# Patient Record
Sex: FEMALE | Race: WHITE | NOT HISPANIC OR LATINO
[De-identification: names, ages, dates, MRNs, and addresses within clinical notes are randomized per-mention and may not be internally consistent; named-entity substitution may affect disease eponyms.]

---

## 2019-04-25 ENCOUNTER — APPOINTMENT (OUTPATIENT)
Dept: VASCULAR SURGERY | Facility: CLINIC | Age: 44
End: 2019-04-25

## 2019-07-24 ENCOUNTER — APPOINTMENT (OUTPATIENT)
Dept: OBGYN | Facility: CLINIC | Age: 44
End: 2019-07-24
Payer: COMMERCIAL

## 2019-07-24 VITALS
BODY MASS INDEX: 27.11 KG/M2 | DIASTOLIC BLOOD PRESSURE: 70 MMHG | HEIGHT: 63 IN | WEIGHT: 153 LBS | SYSTOLIC BLOOD PRESSURE: 122 MMHG

## 2019-07-24 DIAGNOSIS — Z80.49 FAMILY HISTORY OF MALIGNANT NEOPLASM OF OTHER GENITAL ORGANS: ICD-10-CM

## 2019-07-24 DIAGNOSIS — Z80.0 FAMILY HISTORY OF MALIGNANT NEOPLASM OF DIGESTIVE ORGANS: ICD-10-CM

## 2019-07-24 DIAGNOSIS — Z00.00 ENCOUNTER FOR GENERAL ADULT MEDICAL EXAMINATION W/OUT ABNORMAL FINDINGS: ICD-10-CM

## 2019-07-24 DIAGNOSIS — D68.51 ACTIVATED PROTEIN C RESISTANCE: ICD-10-CM

## 2019-07-24 PROCEDURE — 99386 PREV VISIT NEW AGE 40-64: CPT

## 2019-07-24 NOTE — HISTORY OF PRESENT ILLNESS
[Last Mammogram ___] : Last Mammogram was [unfilled] [Perimenopausal] : is perimenopausal [Last Pap ___] : Last cervical pap smear was [unfilled] [Condoms] : uses condoms [Contraception] : uses contraception [Last Colonoscopy ___] : Last colonoscopy [unfilled]

## 2019-07-24 NOTE — PHYSICAL EXAM
[Awake] : awake [Alert] : alert [Soft] : soft [Oriented x3] : oriented to person, place, and time [Normal] : uterus [No Bleeding] : there was no active vaginal bleeding [Uterine Adnexae] : were not tender and not enlarged [Acute Distress] : no acute distress [Mass] : no breast mass [Nipple Discharge] : no nipple discharge [Axillary LAD] : no axillary lymphadenopathy [Tender] : non tender 19

## 2019-07-24 NOTE — CHIEF COMPLAINT
[Initial Visit] : initial GYN visit [FreeTextEntry1] : needs a check up\par Pain on right side\par LMP 7/4/19\par

## 2019-07-25 LAB — HPV HIGH+LOW RISK DNA PNL CVX: NOT DETECTED

## 2019-07-29 LAB — CYTOLOGY CVX/VAG DOC THIN PREP: ABNORMAL

## 2019-08-13 ENCOUNTER — APPOINTMENT (OUTPATIENT)
Dept: OBGYN | Facility: CLINIC | Age: 44
End: 2019-08-13

## 2019-09-12 ENCOUNTER — FORM ENCOUNTER (OUTPATIENT)
Age: 44
End: 2019-09-12

## 2019-09-13 ENCOUNTER — APPOINTMENT (OUTPATIENT)
Dept: ULTRASOUND IMAGING | Facility: IMAGING CENTER | Age: 44
End: 2019-09-13
Payer: COMMERCIAL

## 2019-09-13 ENCOUNTER — OUTPATIENT (OUTPATIENT)
Dept: OUTPATIENT SERVICES | Facility: HOSPITAL | Age: 44
LOS: 1 days | End: 2019-09-13
Payer: COMMERCIAL

## 2019-09-13 ENCOUNTER — APPOINTMENT (OUTPATIENT)
Dept: MAMMOGRAPHY | Facility: IMAGING CENTER | Age: 44
End: 2019-09-13
Payer: COMMERCIAL

## 2019-09-13 DIAGNOSIS — R10.2 PELVIC AND PERINEAL PAIN: ICD-10-CM

## 2019-09-13 PROCEDURE — 77063 BREAST TOMOSYNTHESIS BI: CPT | Mod: 26

## 2019-09-13 PROCEDURE — 77067 SCR MAMMO BI INCL CAD: CPT | Mod: 26

## 2019-09-13 PROCEDURE — 76830 TRANSVAGINAL US NON-OB: CPT | Mod: 26

## 2019-09-13 PROCEDURE — 76641 ULTRASOUND BREAST COMPLETE: CPT | Mod: 26,50

## 2019-09-13 PROCEDURE — 76641 ULTRASOUND BREAST COMPLETE: CPT

## 2019-09-13 PROCEDURE — 77067 SCR MAMMO BI INCL CAD: CPT

## 2019-09-13 PROCEDURE — 76830 TRANSVAGINAL US NON-OB: CPT

## 2019-09-13 PROCEDURE — 77063 BREAST TOMOSYNTHESIS BI: CPT

## 2019-09-19 ENCOUNTER — APPOINTMENT (OUTPATIENT)
Dept: MAMMOGRAPHY | Facility: IMAGING CENTER | Age: 44
End: 2019-09-19

## 2019-09-19 ENCOUNTER — APPOINTMENT (OUTPATIENT)
Dept: ULTRASOUND IMAGING | Facility: IMAGING CENTER | Age: 44
End: 2019-09-19

## 2019-10-09 ENCOUNTER — FORM ENCOUNTER (OUTPATIENT)
Age: 44
End: 2019-10-09

## 2019-10-10 ENCOUNTER — OUTPATIENT (OUTPATIENT)
Dept: OUTPATIENT SERVICES | Facility: HOSPITAL | Age: 44
LOS: 1 days | End: 2019-10-10
Payer: COMMERCIAL

## 2019-10-10 ENCOUNTER — APPOINTMENT (OUTPATIENT)
Dept: ULTRASOUND IMAGING | Facility: IMAGING CENTER | Age: 44
End: 2019-10-10
Payer: COMMERCIAL

## 2019-10-10 ENCOUNTER — APPOINTMENT (OUTPATIENT)
Dept: MAMMOGRAPHY | Facility: IMAGING CENTER | Age: 44
End: 2019-10-10
Payer: COMMERCIAL

## 2019-10-10 DIAGNOSIS — Z00.8 ENCOUNTER FOR OTHER GENERAL EXAMINATION: ICD-10-CM

## 2019-10-10 PROCEDURE — G0279: CPT | Mod: 26

## 2019-10-10 PROCEDURE — 77066 DX MAMMO INCL CAD BI: CPT | Mod: 26

## 2019-10-10 PROCEDURE — 77062 BREAST TOMOSYNTHESIS BI: CPT | Mod: 26

## 2019-10-10 PROCEDURE — G0279: CPT

## 2019-10-10 PROCEDURE — 77066 DX MAMMO INCL CAD BI: CPT

## 2019-12-02 ENCOUNTER — TRANSCRIPTION ENCOUNTER (OUTPATIENT)
Age: 44
End: 2019-12-02

## 2021-03-30 ENCOUNTER — APPOINTMENT (OUTPATIENT)
Dept: GASTROENTEROLOGY | Facility: CLINIC | Age: 46
End: 2021-03-30

## 2021-04-06 ENCOUNTER — APPOINTMENT (OUTPATIENT)
Dept: OBGYN | Facility: CLINIC | Age: 46
End: 2021-04-06
Payer: COMMERCIAL

## 2021-04-06 VITALS
BODY MASS INDEX: 28.7 KG/M2 | WEIGHT: 162 LBS | DIASTOLIC BLOOD PRESSURE: 70 MMHG | TEMPERATURE: 96 F | SYSTOLIC BLOOD PRESSURE: 110 MMHG | HEIGHT: 63 IN

## 2021-04-06 DIAGNOSIS — R10.2 PELVIC AND PERINEAL PAIN: ICD-10-CM

## 2021-04-06 PROCEDURE — 99072 ADDL SUPL MATRL&STAF TM PHE: CPT

## 2021-04-06 PROCEDURE — 99396 PREV VISIT EST AGE 40-64: CPT

## 2021-04-06 NOTE — HISTORY OF PRESENT ILLNESS
[Patient reported mammogram was normal] : Patient reported mammogram was normal [Patient reported PAP Smear was normal] : Patient reported PAP Smear was normal [FreeTextEntry1] : no issues \par Mother with colon at age 40 \par She had endometrial ca with vaginal recurrence\par Genetic testing is negative  [Mammogramdate] : 2019  [PapSmeardate] : 2019

## 2021-04-08 LAB — HPV HIGH+LOW RISK DNA PNL CVX: NOT DETECTED

## 2021-04-12 LAB — CYTOLOGY CVX/VAG DOC THIN PREP: NORMAL

## 2021-05-03 ENCOUNTER — APPOINTMENT (OUTPATIENT)
Dept: GASTROENTEROLOGY | Facility: CLINIC | Age: 46
End: 2021-05-03

## 2021-05-06 ENCOUNTER — NON-APPOINTMENT (OUTPATIENT)
Age: 46
End: 2021-05-06

## 2021-05-06 ENCOUNTER — APPOINTMENT (OUTPATIENT)
Dept: GASTROENTEROLOGY | Facility: CLINIC | Age: 46
End: 2021-05-06
Payer: COMMERCIAL

## 2021-05-06 DIAGNOSIS — Z12.11 ENCOUNTER FOR SCREENING FOR MALIGNANT NEOPLASM OF COLON: ICD-10-CM

## 2021-05-06 DIAGNOSIS — Z80.0 ENCOUNTER FOR SCREENING FOR MALIGNANT NEOPLASM OF COLON: ICD-10-CM

## 2021-05-06 PROCEDURE — 99203 OFFICE O/P NEW LOW 30 MIN: CPT | Mod: 95

## 2021-05-06 NOTE — REASON FOR VISIT
[Home] : at home, [unfilled] , at the time of the visit. [Medical Office: ([facility])___] : at the medical office located in

## 2021-05-13 NOTE — HISTORY OF PRESENT ILLNESS
[FreeTextEntry1] : Mother had colon cancer at 48; also had endometrial CA age 62; then a recurrence of that\par Father also had colon cancer (found in a polyp, in his 60s)\par Sister has polyps\par \par Has had two prior colonoscopies; last 5 or 6 years ago (Dr. Roth); (+) polyps; she woke up during the procedure and that was traumatizing for her\par \par Patient is on the constipated side; drinks coffee, otherwise has BM q3d - this is her pattern as an adult\par \par No GERD, dysphagia\par \par Gained 20 lb during covid\par \par \par

## 2021-05-13 NOTE — ASSESSMENT
[FreeTextEntry1] : \par Discussed risks of the procedure -  including but not limited to bleeding, infection, drug reaction, perforation and missed lesion.  Also discussed benefits and alternatives of this procedure with patient - including no treatment  - and the patient consents to undergoing the procedure.\par

## 2021-06-28 ENCOUNTER — APPOINTMENT (OUTPATIENT)
Dept: DISASTER EMERGENCY | Facility: CLINIC | Age: 46
End: 2021-06-28

## 2021-07-01 ENCOUNTER — APPOINTMENT (OUTPATIENT)
Dept: GASTROENTEROLOGY | Facility: CLINIC | Age: 46
End: 2021-07-01

## 2022-01-01 ENCOUNTER — APPOINTMENT (OUTPATIENT)
Dept: DISASTER EMERGENCY | Facility: CLINIC | Age: 47
End: 2022-01-01

## 2022-01-05 ENCOUNTER — APPOINTMENT (OUTPATIENT)
Dept: GASTROENTEROLOGY | Facility: CLINIC | Age: 47
End: 2022-01-05

## 2023-06-13 ENCOUNTER — APPOINTMENT (OUTPATIENT)
Dept: OBGYN | Facility: CLINIC | Age: 48
End: 2023-06-13

## 2024-03-05 ENCOUNTER — APPOINTMENT (OUTPATIENT)
Dept: OBGYN | Facility: CLINIC | Age: 49
End: 2024-03-05

## 2024-03-05 DIAGNOSIS — R92.30 DENSE BREASTS, UNSPECIFIED: ICD-10-CM

## 2024-03-15 ENCOUNTER — RESULT REVIEW (OUTPATIENT)
Age: 49
End: 2024-03-15

## 2024-03-15 ENCOUNTER — APPOINTMENT (OUTPATIENT)
Dept: MAMMOGRAPHY | Facility: CLINIC | Age: 49
End: 2024-03-15
Payer: COMMERCIAL

## 2024-03-15 PROCEDURE — 77063 BREAST TOMOSYNTHESIS BI: CPT

## 2024-03-15 PROCEDURE — 77067 SCR MAMMO BI INCL CAD: CPT

## 2024-03-18 ENCOUNTER — APPOINTMENT (OUTPATIENT)
Dept: OBGYN | Facility: CLINIC | Age: 49
End: 2024-03-18
Payer: COMMERCIAL

## 2024-03-18 VITALS
DIASTOLIC BLOOD PRESSURE: 81 MMHG | HEIGHT: 63 IN | SYSTOLIC BLOOD PRESSURE: 128 MMHG | WEIGHT: 153 LBS | BODY MASS INDEX: 27.11 KG/M2

## 2024-03-18 DIAGNOSIS — Z01.818 ENCOUNTER FOR OTHER PREPROCEDURAL EXAMINATION: ICD-10-CM

## 2024-03-18 DIAGNOSIS — Z01.419 ENCOUNTER FOR GYNECOLOGICAL EXAMINATION (GENERAL) (ROUTINE) W/OUT ABNORMAL FINDINGS: ICD-10-CM

## 2024-03-18 PROCEDURE — 99396 PREV VISIT EST AGE 40-64: CPT

## 2024-03-18 NOTE — HISTORY OF PRESENT ILLNESS
[FreeTextEntry1] : recent mammo  BIRADS 0 Recommending left breast ultrasound  Also recommended breast MRI for elevated TC score  [Mammogramdate] : 2023  [PapSmeardate] : 2021

## 2024-03-20 ENCOUNTER — RESULT REVIEW (OUTPATIENT)
Age: 49
End: 2024-03-20

## 2024-03-20 ENCOUNTER — APPOINTMENT (OUTPATIENT)
Dept: ULTRASOUND IMAGING | Facility: IMAGING CENTER | Age: 49
End: 2024-03-20
Payer: COMMERCIAL

## 2024-03-20 ENCOUNTER — OUTPATIENT (OUTPATIENT)
Dept: OUTPATIENT SERVICES | Facility: HOSPITAL | Age: 49
LOS: 1 days | End: 2024-03-20
Payer: COMMERCIAL

## 2024-03-20 DIAGNOSIS — Z00.8 ENCOUNTER FOR OTHER GENERAL EXAMINATION: ICD-10-CM

## 2024-03-20 LAB — HPV HIGH+LOW RISK DNA PNL CVX: NOT DETECTED

## 2024-03-20 PROCEDURE — 76642 ULTRASOUND BREAST LIMITED: CPT

## 2024-03-20 PROCEDURE — 76642 ULTRASOUND BREAST LIMITED: CPT | Mod: 26,50

## 2024-03-25 LAB — CYTOLOGY CVX/VAG DOC THIN PREP: NORMAL

## 2025-01-31 ENCOUNTER — NON-APPOINTMENT (OUTPATIENT)
Age: 50
End: 2025-01-31